# Patient Record
Sex: FEMALE | Race: WHITE | Employment: FULL TIME | URBAN - METROPOLITAN AREA
[De-identification: names, ages, dates, MRNs, and addresses within clinical notes are randomized per-mention and may not be internally consistent; named-entity substitution may affect disease eponyms.]

---

## 2024-06-25 ENCOUNTER — HOSPITAL ENCOUNTER (OUTPATIENT)
Age: 29
Discharge: HOME | End: 2024-06-25

## 2024-06-25 ENCOUNTER — APPOINTMENT (OUTPATIENT)
Dept: GENERAL RADIOLOGY | Age: 29
End: 2024-06-25
Attending: NURSE PRACTITIONER

## 2024-06-25 VITALS
TEMPERATURE: 99 F | RESPIRATION RATE: 16 BRPM | SYSTOLIC BLOOD PRESSURE: 119 MMHG | HEART RATE: 58 BPM | OXYGEN SATURATION: 100 % | DIASTOLIC BLOOD PRESSURE: 73 MMHG

## 2024-06-25 DIAGNOSIS — M25.521 RIGHT ELBOW PAIN: Primary | ICD-10-CM

## 2024-06-25 PROCEDURE — 99203 OFFICE O/P NEW LOW 30 MIN: CPT | Performed by: NURSE PRACTITIONER

## 2024-06-25 PROCEDURE — A6448 LT COMPRES BAND <3"/YD: HCPCS | Performed by: NURSE PRACTITIONER

## 2024-06-25 PROCEDURE — 73080 X-RAY EXAM OF ELBOW: CPT | Performed by: NURSE PRACTITIONER

## 2024-06-25 NOTE — ED PROVIDER NOTES
Patient Seen in: [facility]      History     Chief Complaint   Patient presents with    Elbow Pain     Stated Complaint: Rt Elbow pain    Subjective:   29-year-old female with unremarkable medical history presents with complaints of right elbow pain for the past 2 weeks.  Was sitting on the couch and when she got up noticed pain.  States days before had up to weight on room machine but did not have any pain at that time.  Unsure if she bumped her elbow against anything.  Has been taking ibuprofen with little improvement.  States pain worse with movement and lifting objects.  Patient states has been on vacation so has not continued to use the rowing machine.  No swelling, extremity weakness/numbness/tingling            Objective:   History reviewed. No pertinent past medical history.           History reviewed. No pertinent surgical history.             No pertinent social history.            Review of Systems   Constitutional:  Negative for chills and fever.   Musculoskeletal:  Negative for joint swelling.   Neurological:  Negative for numbness.   All other systems reviewed and are negative.      Positive for stated Chief Complaint: Elbow Pain    Other systems are as noted in HPI.  Constitutional and vital signs reviewed.      All other systems reviewed and negative except as noted above.    Physical Exam     ED Triage Vitals [06/25/24 1736]   /73   Pulse 58   Resp 16   Temp 99 °F (37.2 °C)   Temp src Temporal   SpO2 100 %   O2 Device None (Room air)       Current Vitals:   Vital Signs  BP: 119/73  Pulse: 58  Resp: 16  Temp: 99 °F (37.2 °C)  Temp src: Temporal    Oxygen Therapy  SpO2: 100 %  O2 Device: None (Room air)            Physical Exam  Vitals and nursing note reviewed.   Constitutional:       General: She is not in acute distress.     Appearance: Normal appearance.   Cardiovascular:      Rate and Rhythm: Normal rate and regular rhythm.   Pulmonary:      Effort: Pulmonary effort is  normal.      Breath sounds: Normal breath sounds.   Musculoskeletal:         General: Tenderness present. No swelling or deformity. Normal range of motion.      Right elbow: No swelling or deformity. Tenderness present.      Comments: Right elbow: no swelling. Mild diffuse tenderness on palpation. No deformity, crepitus, erythema, warmth. Pain with full extension.  2+brachial/radial pulse.    Skin:     General: Skin is warm and dry.      Capillary Refill: Capillary refill takes less than 2 seconds.   Neurological:      Mental Status: She is alert and oriented to person, place, and time.   Psychiatric:         Behavior: Behavior is cooperative.               ED Course   Labs Reviewed - No data to display  XR ELBOW, COMPLETE (MIN 3 VIEWS), RIGHT (CPT=73080)    Result Date: 6/25/2024  CONCLUSION:  No fracture or dislocation.    Dictated by (CST): Keshav Pelayo MD on 6/25/2024 at 5:54 PM     Finalized by (CST): Keshav Pelayo MD on 6/25/2024 at 5:55 PM                          MDM                                         Medical Decision Making  Patient is well-appearing.  I discussed differentials with patient including but not limited to strain versus arthritis versus fracture  X-ray independently reviewed and discussed with patient.  Normal x-ray  Ace wrap applied by nurse  RICE  otc meds prn  Fu with PCP/Ortho. Return/ ED precautions discussed      Problems Addressed:  Right elbow pain: acute illness or injury    Amount and/or Complexity of Data Reviewed  Radiology: ordered. Decision-making details documented in ED Course.        Disposition and Plan     Clinical Impression:  1. Right elbow pain         Disposition:  Discharge  6/25/2024  6:05 pm    Follow-up:  Ruth Ann Vo MD  [address] [address]  [phone]      or follow up with your Primary Care Physician    Aliza Quevedo MD  [address] [facility] [address]  [place] [id number] [phone]          Moises Canales MD  [address] [facility]  [address]  [facility] [address]  [phone]                Medications Prescribed:  There are no discharge medications for this patient.

## 2024-06-25 NOTE — ED INITIAL ASSESSMENT (HPI)
Pt came in due to right elbow pain for the past 2 weeks. Pt unsure if she bumped it somewhere. Pt has easy non labored respirations.